# Patient Record
Sex: MALE | Race: WHITE | NOT HISPANIC OR LATINO | Employment: OTHER | URBAN - METROPOLITAN AREA
[De-identification: names, ages, dates, MRNs, and addresses within clinical notes are randomized per-mention and may not be internally consistent; named-entity substitution may affect disease eponyms.]

---

## 2017-10-17 ENCOUNTER — ESTABLISHED PATIENT (OUTPATIENT)
Dept: URBAN - METROPOLITAN AREA CLINIC 35 | Facility: CLINIC | Age: 67
End: 2017-10-17

## 2017-10-17 DIAGNOSIS — H04.123: ICD-10-CM

## 2017-10-17 DIAGNOSIS — H52.4: ICD-10-CM

## 2017-10-17 DIAGNOSIS — H40.013: ICD-10-CM

## 2017-10-17 DIAGNOSIS — H52.13: ICD-10-CM

## 2017-10-17 PROCEDURE — G8427 DOCREV CUR MEDS BY ELIG CLIN: HCPCS

## 2017-10-17 PROCEDURE — G8785 BP SCRN NO PERF AT INTERVAL: HCPCS

## 2017-10-17 PROCEDURE — 1036F TOBACCO NON-USER: CPT

## 2017-10-17 PROCEDURE — 92310-3 LEVEL 3 CONTACT LENS MANAGEMENT

## 2017-10-17 PROCEDURE — 92014 COMPRE OPH EXAM EST PT 1/>: CPT

## 2017-10-17 PROCEDURE — 92015 DETERMINE REFRACTIVE STATE: CPT

## 2017-10-17 ASSESSMENT — VISUAL ACUITY
OS_CC: 20/30
OU_CC: 20/25
OS_CC: J10
OU_CC: J1
OD_CC: J2
OD_CC: 20/70

## 2017-10-17 ASSESSMENT — KERATOMETRY
OS_K2POWER_DIOPTERS: 43.00
OD_K2POWER_DIOPTERS: 42.25
OS_K1POWER_DIOPTERS: 42.75
OD_K1POWER_DIOPTERS: 42.00

## 2017-10-17 ASSESSMENT — TONOMETRY
OD_IOP_MMHG: 17
OS_IOP_MMHG: 18

## 2017-10-31 ENCOUNTER — CONTACT LENS FOLLOW UP (OUTPATIENT)
Dept: URBAN - METROPOLITAN AREA CLINIC 35 | Facility: CLINIC | Age: 67
End: 2017-10-31

## 2017-10-31 DIAGNOSIS — H52.4: ICD-10-CM

## 2017-10-31 DIAGNOSIS — H52.13: ICD-10-CM

## 2017-10-31 PROCEDURE — 92310F

## 2017-10-31 ASSESSMENT — KERATOMETRY
OS_K1POWER_DIOPTERS: 42.75
OD_K2POWER_DIOPTERS: 42.25
OD_K1POWER_DIOPTERS: 42.00
OS_K2POWER_DIOPTERS: 43.00

## 2017-11-21 ENCOUNTER — CONTACT LENS FOLLOW UP (OUTPATIENT)
Dept: URBAN - METROPOLITAN AREA CLINIC 35 | Facility: CLINIC | Age: 67
End: 2017-11-21

## 2017-11-21 DIAGNOSIS — H52.4: ICD-10-CM

## 2017-11-21 DIAGNOSIS — H52.13: ICD-10-CM

## 2017-11-21 DIAGNOSIS — H52.203: ICD-10-CM

## 2017-11-21 PROCEDURE — 92310F

## 2017-11-21 ASSESSMENT — KERATOMETRY
OD_K2POWER_DIOPTERS: 42.25
OS_K1POWER_DIOPTERS: 42.75
OD_K1POWER_DIOPTERS: 42.00
OS_K2POWER_DIOPTERS: 43.00

## 2018-10-30 ENCOUNTER — CONTACT LENS EXAM ESTABLISHED (OUTPATIENT)
Dept: URBAN - METROPOLITAN AREA CLINIC 35 | Facility: CLINIC | Age: 68
End: 2018-10-30

## 2018-10-30 DIAGNOSIS — H40.013: ICD-10-CM

## 2018-10-30 DIAGNOSIS — H25.9: ICD-10-CM

## 2018-10-30 DIAGNOSIS — H04.123: ICD-10-CM

## 2018-10-30 PROCEDURE — G9903 PT SCRN TBCO ID AS NON USER: HCPCS

## 2018-10-30 PROCEDURE — 92133 CPTRZD OPH DX IMG PST SGM ON: CPT

## 2018-10-30 PROCEDURE — 92015 DETERMINE REFRACTIVE STATE: CPT

## 2018-10-30 PROCEDURE — G8427 DOCREV CUR MEDS BY ELIG CLIN: HCPCS

## 2018-10-30 PROCEDURE — 92014 COMPRE OPH EXAM EST PT 1/>: CPT

## 2018-10-30 PROCEDURE — 1036F TOBACCO NON-USER: CPT

## 2018-10-30 ASSESSMENT — VISUAL ACUITY
OD_CC: 20/50-2
OD_PH: 20/30-2
OD_CC: J5
OS_CC: 20/40-1
OS_BAT: 20/70
OS_CC: J2
OD_BAT: 20/70

## 2018-10-30 ASSESSMENT — TONOMETRY
OS_IOP_MMHG: 17
OD_IOP_MMHG: 17

## 2018-10-30 ASSESSMENT — KERATOMETRY
OS_K1POWER_DIOPTERS: 42.00
OS_K2POWER_DIOPTERS: 42.50
OD_K1POWER_DIOPTERS: 42.00
OD_K2POWER_DIOPTERS: 42.75

## 2019-11-12 ENCOUNTER — ESTABLISHED COMPREHENSIVE EXAM (OUTPATIENT)
Dept: URBAN - METROPOLITAN AREA CLINIC 35 | Facility: CLINIC | Age: 69
End: 2019-11-12

## 2019-11-12 DIAGNOSIS — H25.9: ICD-10-CM

## 2019-11-12 DIAGNOSIS — H04.123: ICD-10-CM

## 2019-11-12 DIAGNOSIS — H40.013: ICD-10-CM

## 2019-11-12 PROCEDURE — 92015 DETERMINE REFRACTIVE STATE: CPT

## 2019-11-12 PROCEDURE — 92014 COMPRE OPH EXAM EST PT 1/>: CPT

## 2019-11-12 PROCEDURE — 92133 CPTRZD OPH DX IMG PST SGM ON: CPT

## 2019-11-12 ASSESSMENT — TONOMETRY
OS_IOP_MMHG: 16
OD_IOP_MMHG: 16

## 2019-11-12 ASSESSMENT — VISUAL ACUITY
OD_CC: 20/30+1
OD_CC: J2
OS_CC: 20/30+1
OS_CC: J2

## 2020-07-30 NOTE — PATIENT DISCUSSION
OCULAR ROSACEA, OU:  PRESCRIBE WARM COMPRESSES AND EYELID SCRUBS OPTASE QD-BID, ARTIFICIAL TEARS Refresh Relieva BID-QID

## 2020-07-30 NOTE — PATIENT DISCUSSION
RETURN IN 6 WEEKS THEN WANT PT TO SEE DR. JIMÉNEZ FOR LID SURGERY- SENT REFERRAL TODAY TO GET A DATE TO SEE HIM AFTER DR Huma Zelaya NEXT VISIT

## 2020-07-30 NOTE — PATIENT DISCUSSION
New Prescription: erythromycin (erythromycin): ointment: 5 mg/gram (0.5 %) a small amount every night into both eyes 07-

## 2020-09-10 NOTE — PATIENT DISCUSSION
Continue: Thera Tears Nutrition (om-3-epa-dha-fish oil-flax-e): capsule: [phone] wb-wm-oz-unit

## 2020-09-10 NOTE — PATIENT DISCUSSION
Continue: erythromycin (erythromycin): ointment: 5 mg/gram (0.5 %) a small amount every night into both eyes 07-

## 2020-11-16 ENCOUNTER — ESTABLISHED COMPREHENSIVE EXAM (OUTPATIENT)
Dept: URBAN - METROPOLITAN AREA CLINIC 35 | Facility: CLINIC | Age: 70
End: 2020-11-16

## 2020-11-16 DIAGNOSIS — H40.013: ICD-10-CM

## 2020-11-16 DIAGNOSIS — H04.123: ICD-10-CM

## 2020-11-16 DIAGNOSIS — H52.13: ICD-10-CM

## 2020-11-16 DIAGNOSIS — H25.9: ICD-10-CM

## 2020-11-16 PROCEDURE — 92014 COMPRE OPH EXAM EST PT 1/>: CPT

## 2020-11-16 PROCEDURE — 92015 DETERMINE REFRACTIVE STATE: CPT

## 2020-11-16 PROCEDURE — 92133 CPTRZD OPH DX IMG PST SGM ON: CPT

## 2020-11-16 ASSESSMENT — VISUAL ACUITY
OU_CC: J1
OD_CC: 20/100
OD_CC: J2
OS_CC: J5
OS_CC: 20/25
OU_CC: 20/25

## 2020-11-16 ASSESSMENT — KERATOMETRY
OD_K1POWER_DIOPTERS: 42.25
OS_K2POWER_DIOPTERS: 42
OS_K1POWER_DIOPTERS: 41.25
OD_K2POWER_DIOPTERS: 43

## 2020-11-16 ASSESSMENT — TONOMETRY
OS_IOP_MMHG: 16
OD_IOP_MMHG: 16

## 2021-01-14 NOTE — PATIENT DISCUSSION
DERMATOCHALASIS (UPPER LIDS), OU: VISUALLY SIGNIFICANT TO THE PATIENT. - Pt saw Dr. Brothers How 1/12/21 will have lid sx 2/25/21

## 2021-01-14 NOTE — PATIENT DISCUSSION
New Prescription: Maxitrol (neomycin-polymyxin-dexameth): ointment: 3.5-10,000-0.1 mg-unit/g-% 1 a small amount every night into both eyes 01-

## 2021-04-29 NOTE — PATIENT DISCUSSION
Theratears Nutrition Fish Oil 3 daily, restart today (sent fish oil rx to [facility] to see which one they carry)

## 2021-11-16 ASSESSMENT — KERATOMETRY
OS_K2POWER_DIOPTERS: 42
OS_K1POWER_DIOPTERS: 41.25
OD_K2POWER_DIOPTERS: 43
OD_K1POWER_DIOPTERS: 42.25

## 2021-11-17 ENCOUNTER — ESTABLISHED COMPREHENSIVE EXAM (OUTPATIENT)
Dept: URBAN - METROPOLITAN AREA CLINIC 35 | Facility: CLINIC | Age: 71
End: 2021-11-17

## 2021-11-17 DIAGNOSIS — H40.013: ICD-10-CM

## 2021-11-17 DIAGNOSIS — H04.123: ICD-10-CM

## 2021-11-17 DIAGNOSIS — H52.13: ICD-10-CM

## 2021-11-17 DIAGNOSIS — H25.9: ICD-10-CM

## 2021-11-17 PROCEDURE — 92133 CPTRZD OPH DX IMG PST SGM ON: CPT

## 2021-11-17 PROCEDURE — 92015 DETERMINE REFRACTIVE STATE: CPT

## 2021-11-17 PROCEDURE — 92014 COMPRE OPH EXAM EST PT 1/>: CPT

## 2021-11-17 PROCEDURE — 92310-1 LEVEL 1 CONTACT LENS MANAGEMENT

## 2021-11-17 ASSESSMENT — VISUAL ACUITY
OS_CC: 20/25 RGP
OS_CC: J12
OD_PH: 20/30
OD_CC: J4
OU_CC: J3
OU_CC: 20/40 RGP

## 2021-11-17 ASSESSMENT — TONOMETRY
OD_IOP_MMHG: 16
OS_IOP_MMHG: 16

## 2022-11-21 ENCOUNTER — COMPREHENSIVE EXAM (OUTPATIENT)
Dept: URBAN - METROPOLITAN AREA CLINIC 35 | Facility: CLINIC | Age: 72
End: 2022-11-21

## 2022-11-21 DIAGNOSIS — H40.013: ICD-10-CM

## 2022-11-21 DIAGNOSIS — H04.123: ICD-10-CM

## 2022-11-21 DIAGNOSIS — H25.9: ICD-10-CM

## 2022-11-21 DIAGNOSIS — H52.13: ICD-10-CM

## 2022-11-21 PROCEDURE — 92014 COMPRE OPH EXAM EST PT 1/>: CPT

## 2022-11-21 PROCEDURE — 92015 DETERMINE REFRACTIVE STATE: CPT

## 2022-11-21 PROCEDURE — 92133 CPTRZD OPH DX IMG PST SGM ON: CPT

## 2022-11-21 ASSESSMENT — KERATOMETRY
OD_K1POWER_DIOPTERS: 41.75
OS_AXISANGLE2_DEGREES: 125
OS_AXISANGLE_DEGREES: 35
OD_AXISANGLE2_DEGREES: 90
OD_K2POWER_DIOPTERS: 42.75
OS_K2POWER_DIOPTERS: 42.50
OS_K1POWER_DIOPTERS: 42.00
OD_AXISANGLE_DEGREES: 180

## 2022-11-21 ASSESSMENT — VISUAL ACUITY
OS_CC: 20/30-2 RGP
OU_CC: J3 RGP
OS_PH: 20/20-1
OD_PH: 20/30+2

## 2023-01-04 ENCOUNTER — FOLLOW UP (OUTPATIENT)
Dept: URBAN - METROPOLITAN AREA CLINIC 35 | Facility: CLINIC | Age: 73
End: 2023-01-04

## 2023-01-04 DIAGNOSIS — H04.123: ICD-10-CM

## 2023-01-04 DIAGNOSIS — H25.9: ICD-10-CM

## 2023-01-04 DIAGNOSIS — H40.023: ICD-10-CM

## 2023-01-04 PROCEDURE — 92012 INTRM OPH EXAM EST PATIENT: CPT

## 2023-01-04 ASSESSMENT — VISUAL ACUITY
OS_PH: 20/70
OD_CC: 20/70
OU_CC: J3
OU_SC: 20/200
OS_SC: 20/400
OD_SC: 20/400
OS_CC: 20/30
OD_PH: 20/60

## 2023-01-04 ASSESSMENT — TONOMETRY
OS_IOP_MMHG: 19
OD_IOP_MMHG: 18

## 2023-01-04 ASSESSMENT — KERATOMETRY
OS_AXISANGLE2_DEGREES: 125
OS_K2POWER_DIOPTERS: 42.50
OD_K2POWER_DIOPTERS: 42.75
OD_AXISANGLE_DEGREES: 180
OD_K1POWER_DIOPTERS: 41.75
OS_AXISANGLE_DEGREES: 35
OS_K1POWER_DIOPTERS: 42.00
OD_AXISANGLE2_DEGREES: 90

## 2023-05-24 ENCOUNTER — FOLLOW UP (OUTPATIENT)
Dept: URBAN - METROPOLITAN AREA CLINIC 35 | Facility: CLINIC | Age: 73
End: 2023-05-24

## 2023-05-24 DIAGNOSIS — H25.9: ICD-10-CM

## 2023-05-24 DIAGNOSIS — H40.023: ICD-10-CM

## 2023-05-24 DIAGNOSIS — H04.123: ICD-10-CM

## 2023-05-24 PROCEDURE — 92012 INTRM OPH EXAM EST PATIENT: CPT

## 2023-05-24 ASSESSMENT — VISUAL ACUITY
OS_CC: 20/40 CL
OS_PH: 20/25+2
OD_PH: 20/30

## 2023-05-24 ASSESSMENT — TONOMETRY
OD_IOP_MMHG: 13?
OS_IOP_MMHG: 14?

## 2023-11-28 ENCOUNTER — COMPREHENSIVE EXAM (OUTPATIENT)
Dept: URBAN - METROPOLITAN AREA CLINIC 35 | Facility: CLINIC | Age: 73
End: 2023-11-28

## 2023-11-28 DIAGNOSIS — H04.123: ICD-10-CM

## 2023-11-28 DIAGNOSIS — Q14.1: ICD-10-CM

## 2023-11-28 DIAGNOSIS — H25.9: ICD-10-CM

## 2023-11-28 DIAGNOSIS — H40.1131: ICD-10-CM

## 2023-11-28 DIAGNOSIS — H52.13: ICD-10-CM

## 2023-11-28 PROCEDURE — 92015 DETERMINE REFRACTIVE STATE: CPT

## 2023-11-28 PROCEDURE — 92014 COMPRE OPH EXAM EST PT 1/>: CPT

## 2023-11-28 PROCEDURE — 92133 CPTRZD OPH DX IMG PST SGM ON: CPT

## 2023-11-28 ASSESSMENT — KERATOMETRY
OD_K2POWER_DIOPTERS: 42.75
OD_K1POWER_DIOPTERS: 42.00
OD_AXISANGLE2_DEGREES: 90
OS_AXISANGLE2_DEGREES: 120
OS_K2POWER_DIOPTERS: 42.75
OD_AXISANGLE_DEGREES: 180
OS_K1POWER_DIOPTERS: 42.00
OS_AXISANGLE_DEGREES: 030

## 2023-11-28 ASSESSMENT — TONOMETRY
OS_IOP_MMHG: 12
OD_IOP_MMHG: 13

## 2023-11-28 ASSESSMENT — VISUAL ACUITY: OU_CC: 20/40 RGP

## 2024-02-23 ENCOUNTER — TECH ONLY (OUTPATIENT)
Dept: URBAN - METROPOLITAN AREA CLINIC 35 | Facility: CLINIC | Age: 74
End: 2024-02-23

## 2024-02-23 DIAGNOSIS — H40.1131: ICD-10-CM

## 2024-02-23 PROCEDURE — 99211T TECH SERVICE

## 2024-02-23 PROCEDURE — 92083 EXTENDED VISUAL FIELD XM: CPT

## 2024-02-23 ASSESSMENT — KERATOMETRY
OD_K2POWER_DIOPTERS: 42.75
OD_K1POWER_DIOPTERS: 42.00
OS_K2POWER_DIOPTERS: 42.75
OD_AXISANGLE2_DEGREES: 90
OS_AXISANGLE_DEGREES: 030
OD_AXISANGLE_DEGREES: 180
OS_K1POWER_DIOPTERS: 42.00
OS_AXISANGLE2_DEGREES: 120

## 2024-05-20 ENCOUNTER — PREPPED CHART (OUTPATIENT)
Dept: URBAN - METROPOLITAN AREA CLINIC 35 | Facility: CLINIC | Age: 74
End: 2024-05-20

## 2024-05-20 ASSESSMENT — KERATOMETRY
OD_AXISANGLE2_DEGREES: 90
OD_K1POWER_DIOPTERS: 42.00
OS_AXISANGLE_DEGREES: 030
OS_K2POWER_DIOPTERS: 42.75
OD_AXISANGLE_DEGREES: 180
OS_K1POWER_DIOPTERS: 42.00
OS_AXISANGLE2_DEGREES: 120
OD_K2POWER_DIOPTERS: 42.75

## 2024-12-09 ENCOUNTER — COMPREHENSIVE EXAM (OUTPATIENT)
Age: 74
End: 2024-12-09

## 2024-12-09 DIAGNOSIS — Q14.1: ICD-10-CM

## 2024-12-09 DIAGNOSIS — H40.1131: ICD-10-CM

## 2024-12-09 DIAGNOSIS — H04.123: ICD-10-CM

## 2024-12-09 DIAGNOSIS — H52.13: ICD-10-CM

## 2024-12-09 DIAGNOSIS — H25.9: ICD-10-CM

## 2024-12-09 PROCEDURE — 92014 COMPRE OPH EXAM EST PT 1/>: CPT

## 2024-12-09 PROCEDURE — 92133 CPTRZD OPH DX IMG PST SGM ON: CPT

## 2024-12-09 PROCEDURE — 92015 DETERMINE REFRACTIVE STATE: CPT

## 2025-03-11 ENCOUNTER — TECH ONLY (OUTPATIENT)
Age: 75
End: 2025-03-11

## 2025-03-11 DIAGNOSIS — H40.1131: ICD-10-CM

## 2025-03-11 PROCEDURE — 92083 EXTENDED VISUAL FIELD XM: CPT

## 2025-03-11 PROCEDURE — 99211T TECH SERVICE

## 2025-06-09 ENCOUNTER — FOLLOW UP (OUTPATIENT)
Age: 75
End: 2025-06-09

## 2025-06-09 DIAGNOSIS — H40.1131: ICD-10-CM

## 2025-06-09 DIAGNOSIS — H04.123: ICD-10-CM

## 2025-06-09 DIAGNOSIS — Q14.1: ICD-10-CM

## 2025-06-09 DIAGNOSIS — H25.9: ICD-10-CM

## 2025-06-09 PROCEDURE — 92012 INTRM OPH EXAM EST PATIENT: CPT
